# Patient Record
Sex: FEMALE | Race: WHITE | NOT HISPANIC OR LATINO | Employment: UNEMPLOYED | URBAN - METROPOLITAN AREA
[De-identification: names, ages, dates, MRNs, and addresses within clinical notes are randomized per-mention and may not be internally consistent; named-entity substitution may affect disease eponyms.]

---

## 2022-12-15 ENCOUNTER — OFFICE VISIT (OUTPATIENT)
Dept: MEDICAL GROUP | Facility: PHYSICIAN GROUP | Age: 62
End: 2022-12-15
Payer: COMMERCIAL

## 2022-12-15 VITALS
HEIGHT: 63 IN | WEIGHT: 137.8 LBS | DIASTOLIC BLOOD PRESSURE: 70 MMHG | SYSTOLIC BLOOD PRESSURE: 128 MMHG | TEMPERATURE: 98.7 F | HEART RATE: 86 BPM | OXYGEN SATURATION: 96 % | BODY MASS INDEX: 24.41 KG/M2

## 2022-12-15 DIAGNOSIS — Z12.11 COLON CANCER SCREENING: ICD-10-CM

## 2022-12-15 DIAGNOSIS — M25.562 CHRONIC PAIN OF LEFT KNEE: ICD-10-CM

## 2022-12-15 DIAGNOSIS — G89.29 CHRONIC PAIN OF LEFT KNEE: ICD-10-CM

## 2022-12-15 DIAGNOSIS — R03.0 ELEVATED BLOOD PRESSURE READING: ICD-10-CM

## 2022-12-15 DIAGNOSIS — R00.2 PALPITATIONS: ICD-10-CM

## 2022-12-15 DIAGNOSIS — E78.5 HYPERLIPIDEMIA, UNSPECIFIED HYPERLIPIDEMIA TYPE: ICD-10-CM

## 2022-12-15 PROBLEM — Z86.0100 PERSONAL HISTORY OF COLONIC POLYPS: Status: ACTIVE | Noted: 2021-02-04

## 2022-12-15 PROBLEM — M51.26 LUMBAR DISC HERNIATION: Status: ACTIVE | Noted: 2022-12-15

## 2022-12-15 PROBLEM — M25.569 KNEE PAIN: Status: ACTIVE | Noted: 2022-12-15

## 2022-12-15 PROBLEM — Z86.010 PERSONAL HISTORY OF COLONIC POLYPS: Status: ACTIVE | Noted: 2021-02-04

## 2022-12-15 PROCEDURE — 99204 OFFICE O/P NEW MOD 45 MIN: CPT | Performed by: FAMILY MEDICINE

## 2022-12-15 ASSESSMENT — ENCOUNTER SYMPTOMS
CHILLS: 0
DIZZINESS: 0
SHORTNESS OF BREATH: 0
NAUSEA: 0
FEVER: 0
SORE THROAT: 0
MYALGIAS: 0
BLURRED VISION: 0

## 2022-12-15 ASSESSMENT — PATIENT HEALTH QUESTIONNAIRE - PHQ9: CLINICAL INTERPRETATION OF PHQ2 SCORE: 0

## 2022-12-15 NOTE — LETTER
[facility]  Chhaya Chinchilla M.D.  [address]  [place] [address]  Fax: [phone]   Authorization for Release/Disclosure of   Protected Health Information   Name: ISADORA WILLIAMSON : 1960 SSN: [id number]   Address: [address] [place] [address]   [place] [address] Phone:    [phone] (home) [phone] (work)   I authorize the entity listed below to release/disclose the PHI below to:   [facility]/Chhaya Chinchilla M.D. and Chhaya Chinchilla M.D.   Provider or Entity Name:  VIRGIL Paulson   Address   City, State, [facility]   Phone:      Fax:     Reason for request: continuity of care   Information to be released:    [  ] LAST COLONOSCOPY,  including any PATH REPORT and follow-up  [  ] LAST FIT/COLOGUARD RESULT [  ] LAST DEXA  [  ] LAST MAMMOGRAM  [  ] LAST PAP  [  ] LAST LABS [  ] RETINA EXAM REPORT  [XX] IMMUNIZATION RECORDS  [XX] Release all info      [  ] Check here and initial the line next to each item to release ALL health information INCLUDING  _____ Care and treatment for drug and / or alcohol abuse  _____ HIV testing, infection status, or AIDS  _____ Genetic Testing    DATES OF SERVICE OR TIME PERIOD TO BE DISCLOSED: _____________  I understand and acknowledge that:  * This Authorization may be revoked at any time by you in writing, except if your health information has already been used or disclosed.  * Your health information that will be used or disclosed as a result of you signing this authorization could be re-disclosed by the recipient. If this occurs, your re-disclosed health information may no longer be protected by State or Federal laws.  * You may refuse to sign this Authorization. Your refusal will not affect your ability to obtain treatment.  * This Authorization becomes effective upon signing and will  on (date) __________.      If no date is indicated, this Authorization will  one (1) year from the signature date.    Name: Isadora Williamson    Signature:    Date:     12/15/2022       PLEASE FAX REQUESTED RECORDS BACK TO: [phone]

## 2022-12-15 NOTE — PROGRESS NOTES
Subjective:     CC:  Diagnoses of Palpitations, Elevated blood pressure reading, Hyperlipidemia, unspecified hyperlipidemia type, Chronic pain of left knee, and Colon cancer screening were pertinent to this visit.    HISTORY OF THE PRESENT ILLNESS: Patient is a 62 y.o. female. This pleasant patient is here today to establish care and discuss BP, palpitations. Her prior PCP was Dr. Gonzalez in [place], [place].    Problem   Palpitations    Chronic. Occasional.   Got a Holter monitor x2 weeks - normal. This was before 2020.  2 days ago BP went up to 160/100. Measured by manual cuff.      Elevated Blood Pressure Reading   Knee Pain    Left knee  Trauma - during kickboxing, 2 years ago  Had MRI in [place] - patellar fracture  Wondering if needs surgery  There will be pain after skiing.      Lumbar Disc Herniation    L5-L6  Has done PT which worked well in the past  Not doing the home PT exercises     Personal History of Colonic Polyps    Formatting of this note might be different from the original.  [id number] [facility] Non-Malignant Neoplastic Polyp Data  Region/  [1]: [place]/ Vaughn Whitfield MD  Date of Procedure [2]: 2/4/2021  Tubular adenoma(s) [3]: 6  Tubulovillous adenoma(s) [4]: 0  Villous adenoma(s) [5]: 0  Sessile serrated adenoma(s) [6]: 5   hyperplastic polyp(s) [7]: 1  Other (specify) [8]: N/A  Colorectal cancer found (Yes/No) [9]: No  END [id number]     Hyperlipidemia       Health Maintenance: Completed    ROS:   Review of Systems   Constitutional:  Negative for chills and fever.   HENT:  Negative for sore throat.    Eyes:  Negative for blurred vision.   Respiratory:  Negative for shortness of breath.    Cardiovascular:  Negative for chest pain.   Gastrointestinal:  Negative for nausea.   Genitourinary:  Negative for dysuria.   Musculoskeletal:  Negative for myalgias.   Skin:  Negative for rash.   Neurological:  Negative for dizziness.       Objective:     Exam: /70 (BP Location: Left arm,  "Patient Position: Sitting, BP Cuff Size: Small adult)   Pulse 86   Temp 37.1 °C (98.7 °F) (Temporal)   Ht 1.59 m (5' 2.6\")   Wt 62.5 kg (137 lb 12.8 oz)   SpO2 96%  Body mass index is 24.72 kg/m².    Physical Exam  Constitutional:       Appearance: Normal appearance.   HENT:      Head: Normocephalic and atraumatic.      Right Ear: Tympanic membrane and ear canal normal.      Left Ear: Tympanic membrane and ear canal normal.      Mouth/Throat:      Mouth: Mucous membranes are moist.      Pharynx: Oropharynx is clear.   Eyes:      Extraocular Movements: Extraocular movements intact.      Pupils: Pupils are equal, round, and reactive to light.   Neck:      Thyroid: No thyromegaly.   Cardiovascular:      Rate and Rhythm: Normal rate and regular rhythm.      Heart sounds: Normal heart sounds.   Pulmonary:      Effort: Pulmonary effort is normal.      Breath sounds: Normal breath sounds.   Abdominal:      General: Abdomen is flat. Bowel sounds are normal.      Palpations: Abdomen is soft. There is no mass.      Tenderness: There is no abdominal tenderness. There is no guarding.   Musculoskeletal:      Cervical back: Normal range of motion and neck supple.      Right lower leg: No edema.      Left lower leg: No edema.   Lymphadenopathy:      Cervical: No cervical adenopathy.   Skin:     General: Skin is warm and dry.   Neurological:      General: No focal deficit present.      Mental Status: She is alert.       Assessment & Plan:   62 y.o. female with the following -    1. Palpitations  Chronic, stable.  She reports that she is had intermittent episodes of palpitations.  She reports before 2020 she did have a 2-week heart monitor that was normal.  We will continue to monitor.    2. Elevated blood pressure reading  Chronic, stable.  She reports that she intermittently gets elevated blood pressure readings.  She checks her self with a manual cuff.  She states 2 days ago the blood pressure went up to 160/100 but her " blood pressure is fine in the office today.  We will continue to monitor.    - CBC WITHOUT DIFFERENTIAL; Future    3. Hyperlipidemia, unspecified hyperlipidemia type  Chronic, status unknown.  She states has not had any lab work for the last 2-3 years and we will go ahead and get basic screening labs.  - CBC WITHOUT DIFFERENTIAL; Future  - Comp Metabolic Panel; Future  - Lipid Profile; Future    4. Chronic pain of left knee  Chronic, uncontrolled.  She reports that she fractured her left patella during kickboxing 2 years ago.  This was diagnosed via MRI in [place].  She is wondering if it needs to be surgically treated as she is getting pain especially after skiing.  We will get an x-ray to evaluate the fracture and send her to orthopedics.  - DX-KNEE COMPLETE 4+ LEFT; Future  - Referral to Orthopedics    5. Colon cancer screening  She reports that she had a colonoscopy 2-3 years ago in [place].  She could not tell me when the next recommended colonoscopy was but then later in the visit she mentioned it was a poor bowel prep.  I am very confused about where she is in the colon screening process and timing and she would like to see an [facility] for colonoscopy so referral has been placed today.  - Referral to GI for Colonoscopy    Return in about 4 months (around 4/15/2023) for Review medical records.    Please note that this dictation was created using voice recognition software. I have made every reasonable attempt to correct obvious errors, but I expect that there are errors of grammar and possibly content that I did not discover before finalizing the note.

## 2022-12-22 ENCOUNTER — APPOINTMENT (OUTPATIENT)
Dept: RADIOLOGY | Facility: MEDICAL CENTER | Age: 62
End: 2022-12-22
Attending: FAMILY MEDICINE
Payer: COMMERCIAL

## 2022-12-22 DIAGNOSIS — G89.29 CHRONIC PAIN OF LEFT KNEE: ICD-10-CM

## 2022-12-22 DIAGNOSIS — M25.562 CHRONIC PAIN OF LEFT KNEE: ICD-10-CM

## 2022-12-22 PROCEDURE — 73564 X-RAY EXAM KNEE 4 OR MORE: CPT | Mod: LT

## 2022-12-23 ENCOUNTER — TELEPHONE (OUTPATIENT)
Dept: MEDICAL GROUP | Facility: PHYSICIAN GROUP | Age: 62
End: 2022-12-23
Payer: COMMERCIAL

## 2022-12-23 NOTE — TELEPHONE ENCOUNTER
----- Message from Chhaya Chinchilla M.D. sent at 12/22/2022  2:57 PM PST -----  Recent x-ray of the knee does show some arthritis in the lateral compartment.  No evidence of recurrent fracture and it is hard to visualize where the patellar fracture was previously.    If she still wants to see orthopedics then she is more than welcome to contact [facility].  That was where the referral was sent.

## 2022-12-23 NOTE — TELEPHONE ENCOUNTER
Phone Number Called: [phone]     Call outcome: Spoke to patient regarding message below.    Message: Spoke to patient and informed results. No further questions at this time.

## 2023-04-27 ENCOUNTER — OFFICE VISIT (OUTPATIENT)
Dept: MEDICAL GROUP | Facility: PHYSICIAN GROUP | Age: 63
End: 2023-04-27
Payer: COMMERCIAL

## 2023-04-27 VITALS
HEIGHT: 62 IN | BODY MASS INDEX: 24.66 KG/M2 | TEMPERATURE: 98.1 F | WEIGHT: 134 LBS | HEART RATE: 80 BPM | DIASTOLIC BLOOD PRESSURE: 62 MMHG | OXYGEN SATURATION: 98 % | SYSTOLIC BLOOD PRESSURE: 104 MMHG

## 2023-04-27 DIAGNOSIS — M25.562 CHRONIC PAIN OF LEFT KNEE: Primary | ICD-10-CM

## 2023-04-27 DIAGNOSIS — G89.29 CHRONIC PAIN OF LEFT KNEE: Primary | ICD-10-CM

## 2023-04-27 DIAGNOSIS — A04.8 H. PYLORI INFECTION: ICD-10-CM

## 2023-04-27 DIAGNOSIS — E78.00 PURE HYPERCHOLESTEROLEMIA: ICD-10-CM

## 2023-04-27 DIAGNOSIS — M51.26 LUMBAR DISC HERNIATION: ICD-10-CM

## 2023-04-27 DIAGNOSIS — Z12.31 ENCOUNTER FOR SCREENING MAMMOGRAM FOR MALIGNANT NEOPLASM OF BREAST: ICD-10-CM

## 2023-04-27 PROCEDURE — 99214 OFFICE O/P EST MOD 30 MIN: CPT | Performed by: FAMILY MEDICINE

## 2023-04-27 ASSESSMENT — ENCOUNTER SYMPTOMS
CHILLS: 0
SHORTNESS OF BREATH: 0
FEVER: 0

## 2023-04-27 ASSESSMENT — PATIENT HEALTH QUESTIONNAIRE - PHQ9: CLINICAL INTERPRETATION OF PHQ2 SCORE: 0

## 2023-04-27 NOTE — PROGRESS NOTES
"Subjective:     CC: review medical records, H pylori, back pain/sciatica    HPI:   Isadora presents today with    Problem   H. Pylori Infection    UGD in [place] in early 4/2023  +H pylori  Treated with bismuth quadruple - PPI, bismuth, metronidazole, tetracycline  Today is last day     Knee Pain    Left knee  Trauma - during kickboxing, 2 years ago  Had MRI in [place] - patellar fracture  Seen orthopedics  Repeat MRI showed no patellar fracture, but she has lateral meniscus tear with moderate lateral arthritis     Lumbar Disc Herniation    L5-L6  Has done PT which worked well in the past  Not doing the home PT exercises    Fell 3/16/2023  Bilateral buttock pain   Radiates posterior legs to heels  Tried massage, yoga - helping but not gone     Hyperlipidemia    Chronic.     3/2023  T chol 206  TG 67  HDL 86           Health Maintenance: Completed    ROS:  Review of Systems   Constitutional:  Negative for chills and fever.   Respiratory:  Negative for shortness of breath.    Cardiovascular:  Negative for chest pain.     Objective:     Exam:  /62 (BP Location: Right arm, Patient Position: Sitting, BP Cuff Size: Adult)   Pulse 80   Temp 36.7 °C (98.1 °F) (Temporal)   Ht 1.575 m (5' 2\")   Wt 60.8 kg (134 lb)   SpO2 98%   BMI 24.51 kg/m²  Body mass index is 24.51 kg/m².    Physical Exam  Constitutional:       Appearance: Normal appearance.   Cardiovascular:      Rate and Rhythm: Normal rate and regular rhythm.      Heart sounds: Normal heart sounds.   Pulmonary:      Effort: Pulmonary effort is normal.      Breath sounds: Normal breath sounds.   Musculoskeletal:      Cervical back: Normal range of motion and neck supple.      Comments: Back: Full ROM, 5/5 LE strength, sensation intact bilaterally in LE, no TTP over spinous processes, paraspinals non-tender, SI joint tender bilaterally, straight leg raise negative, Telly test positive bilaterally   Neurological:      Mental Status: She is alert. "           Assessment & Plan:     63 y.o. female with the following -     1. Chronic pain of left knee  Chronic, stable.  She is established with orthopedics and MRI showed that the patella fracture has healed but she did develop a lateral meniscus tear.  Currently is not causing her a lot of pains at this time they are not treating.  She will follow-up with orthopedics as directed.    2. Pure hypercholesterolemia  Chronic, stable.  Recent cholesterol panel did show mildly elevated total cholesterol and LDL.  ASCVD risk is under 5% so we do not need start medication at this time.  We will monitor regularly.    3. H. pylori infection  Acute.  She reports that she recently had an upper endoscopy in [place] earlier this month.  It did show H. pylori and today is the last day of a bismuth quadruple therapy.  We will recheck for the H. pylori stool antigen in at least 4 weeks to confirm eradication of this infection.  - H.PYLORI STOOL ANTIGEN; Future    4. Lumbar disc herniation  Chronic, acutely exacerbated.  She does have a history of lumbar disc herniation has had back pain in the past.  Recently she has been noticing symptoms of sciatica bilaterally.  Her exam does indicate likely SI joint dysfunction with piriformis syndrome bilaterally.  I have provided a prescription with home exercises.  If this does not help and we can get her back to physical therapy.  She can use over-the-counter pain medication as needed.    5. Encounter for screening mammogram for malignant neoplasm of breast  - MA-SCREENING MAMMO BILAT W/TOMOSYNTHESIS W/CAD; Future    I spent a total of 35 minutes with record review, outside record review for health maintenance and vaccinations, reviewed labs, exam, communication with the patient, and documentation of this encounter.      Return in about 6 months (around 10/27/2023) for Annual/wellness visit.    Please note that this dictation was created using voice recognition software. I have made every  reasonable attempt to correct obvious errors, but I expect that there are errors of grammar and possibly content that I did not discover before finalizing the note.

## 2023-04-27 NOTE — LETTER
[facility]  Chhaya Chinchilla M.D.  [address]  [place] [address]  Fax: [phone]   Authorization for Release/Disclosure of   Protected Health Information   Name: ISADORA WILLIAMSON : 1960 SSN: [id number]   Address: [address] [place] [address]   [place] [address] Phone:    [phone] (home) [phone] (work)   I authorize the entity listed below to release/disclose the PHI below to:   [facility]/Chhaya Chinchilla M.D. and Chhaya Chinchilla M.D.   Provider or Entity Name:     Address   City, State, Zip   Phone:      Fax:     Reason for request: continuity of care   Information to be released:    [  ] LAST COLONOSCOPY,  including any PATH REPORT and follow-up  [  ] LAST FIT/COLOGUARD RESULT [  ] LAST DEXA  [  ] LAST MAMMOGRAM  [  ] LAST PAP  [  ] LAST LABS [  ] RETINA EXAM REPORT  [  ] IMMUNIZATION RECORDS  [  ] Release all info      [  ] Check here and initial the line next to each item to release ALL health information INCLUDING  _____ Care and treatment for drug and / or alcohol abuse  _____ HIV testing, infection status, or AIDS  _____ Genetic Testing    DATES OF SERVICE OR TIME PERIOD TO BE DISCLOSED: _____________  I understand and acknowledge that:  * This Authorization may be revoked at any time by you in writing, except if your health information has already been used or disclosed.  * Your health information that will be used or disclosed as a result of you signing this authorization could be re-disclosed by the recipient. If this occurs, your re-disclosed health information may no longer be protected by State or Federal laws.  * You may refuse to sign this Authorization. Your refusal will not affect your ability to obtain treatment.  * This Authorization becomes effective upon signing and will  on (date) __________.      If no date is indicated, this Authorization will  one (1) year from the signature date.    Name: Isadora Williamson  Signature: Date:   2023     PLEASE FAX  REQUESTED RECORDS BACK TO: [phone]

## 2023-05-01 ENCOUNTER — HOSPITAL ENCOUNTER (OUTPATIENT)
Dept: RADIOLOGY | Facility: MEDICAL CENTER | Age: 63
End: 2023-05-01
Attending: FAMILY MEDICINE
Payer: COMMERCIAL

## 2023-05-01 DIAGNOSIS — Z12.31 ENCOUNTER FOR SCREENING MAMMOGRAM FOR MALIGNANT NEOPLASM OF BREAST: ICD-10-CM

## 2023-05-01 PROCEDURE — 77063 BREAST TOMOSYNTHESIS BI: CPT

## 2023-05-03 ENCOUNTER — NON-PROVIDER VISIT (OUTPATIENT)
Dept: MEDICAL GROUP | Facility: PHYSICIAN GROUP | Age: 63
End: 2023-05-03
Payer: COMMERCIAL

## 2023-05-03 ENCOUNTER — TELEPHONE (OUTPATIENT)
Dept: MEDICAL GROUP | Facility: PHYSICIAN GROUP | Age: 63
End: 2023-05-03

## 2023-05-03 DIAGNOSIS — Z23 NEED FOR VACCINATION: ICD-10-CM

## 2023-05-03 PROCEDURE — 90715 TDAP VACCINE 7 YRS/> IM: CPT | Performed by: FAMILY MEDICINE

## 2023-05-03 PROCEDURE — 90471 IMMUNIZATION ADMIN: CPT | Performed by: FAMILY MEDICINE

## 2023-05-03 NOTE — PROGRESS NOTES
"Isadora Williamson is a 63 y.o. female here for a non-provider visit for:   TDAP    Reason for immunization: continue or complete series started at the office  Immunization records indicate need for vaccine: Yes, confirmed with Epic  Minimum interval has been met for this vaccine: Yes  ABN completed: Not Indicated    VIS Dated  8/6/21 was given to patient: Yes  All IAC Questionnaire questions were answered \"No.\"    Patient tolerated injection and no adverse effects were observed or reported: Yes    Pt scheduled for next dose in series: No  "

## 2023-05-03 NOTE — TELEPHONE ENCOUNTER
Patient is on the MA Schedule today for TDAP vaccine/injection.    SPECIFIC Action To Be Taken: Orders pending, please sign.

## 2023-05-23 ENCOUNTER — HOSPITAL ENCOUNTER (OUTPATIENT)
Dept: RADIOLOGY | Facility: MEDICAL CENTER | Age: 63
End: 2023-05-23
Payer: COMMERCIAL

## 2023-07-11 ENCOUNTER — HOSPITAL ENCOUNTER (OUTPATIENT)
Dept: RADIOLOGY | Facility: MEDICAL CENTER | Age: 63
End: 2023-07-11
Attending: PHYSICAL MEDICINE & REHABILITATION
Payer: COMMERCIAL

## 2023-07-11 DIAGNOSIS — M54.2 CERVICALGIA: ICD-10-CM

## 2023-07-11 DIAGNOSIS — R10.2 ADNEXAL TENDERNESS, RIGHT: ICD-10-CM

## 2023-07-11 DIAGNOSIS — M54.50 LOW BACK PAIN, UNSPECIFIED BACK PAIN LATERALITY, UNSPECIFIED CHRONICITY, UNSPECIFIED WHETHER SCIATICA PRESENT: ICD-10-CM

## 2023-07-11 PROCEDURE — 72040 X-RAY EXAM NECK SPINE 2-3 VW: CPT

## 2023-07-11 PROCEDURE — 72100 X-RAY EXAM L-S SPINE 2/3 VWS: CPT

## 2023-07-11 PROCEDURE — 72170 X-RAY EXAM OF PELVIS: CPT

## 2023-07-12 NOTE — OP THERAPY EVALUATION
Outpatient Physical Therapy  INITIAL EVALUATION    [facility] [place]  [address], [address]  [place] [address]  Phone:  [phone]    Date of Evaluation: 07/13/2023    Patient: Isadora Williamson  YOB: 1960  MRN: [id number]     Referring Provider: Chhaya Chinchilla M.D.  [address]  [place] [address]   Referring Diagnosis No admission diagnoses are documented for this encounter.     Time Calculation  Start time: 0130  Stop time: 0215 Time Calculation (min): 45 minutes         Chief Complaint: No chief complaint on file.    Visit Diagnoses     ICD-10-CM   1. Lumbar disc herniation  M51.26       Date of onset of impairment: 2/13/2023    Subjective:   History of Present Illness:     Mechanism of injury:  CMHx: Pt indicates that she had hx of LBP with hernia around L3/4 she thinks she had that. She notes it got better with physiotherapy. She normally is really active but after this fall she has had trouble with pains in her buttock region and down the left leg. She notes that relates a lot of this pain to this fall. She notes that she is really stiff. She notes she had Physio in [place] in March for about two weeks  with cold and hot packs, estim/laser and massage; felt worse the day after so she stopped. She notes she will be leaving back to [place] for at least one month to work in September.     Pain Behavior  Sxs: R sided LBP and B buttock pain; and tingling in the lateral legs down to the shins and to the lateral or anterior feet     Aggs: hiking or walking notes she can't walk fast mostly on the R, couldn't sleep on the R (better now), couldn't lie on her stomach or still struggles in prone press up,     Eas: piriformis stretching,     24 hour: AM worse and stiff in buttock and lower back; does get better within 1 hr. Tries to stretch    Sleep: does okay but struggles with the R side upper quarter     Irritability: mod    Yellow flags: scared to have  "bulging disc or issues    Imaging: radiographs of L/S so far    PMHx: hx of LBP and bulging disc and had some strength exercises which was helpful. Original bulging disc in early 2000s but recovered and was doing well after      Profession/Recreation: normally goes to gym almost every day; since moved to [place] approx 1 year ago she has stopped exercising as much. She has mostly just skied now and occ fitness classes, She bikes now and notes no issues here. Walking/hiking. Kayaking more due to R upper quarter but LBP >1 hr    Goals: dec pain                            No past medical history on file.  Past Surgical History:   Procedure Laterality Date    REPAIR, KNEE, ACL Right 2004    PRIMARY C SECTION  1996    PRIMARY C SECTION  1993     Social History     Tobacco Use    Smoking status: Never    Smokeless tobacco: Never   Substance Use Topics    Alcohol use: Yes     Alcohol/week: 1.8 oz     Types: 3 Glasses of wine per week     Family and Occupational History     Socioeconomic History    Marital status:      Spouse name: Not on file    Number of children: Not on file    Years of education: Not on file    Highest education level: Not on file   Occupational History    Not on file       Objective     General Comments     Spine Comments   Standing:  Hip PROm Wfl ea side no issues; L slightly tighter per pt with ER stretch    AROM  Flexion tighter L leg WFL  Extension inc R sided LBP WFL  RSB WFL  LSB WFL     + piriformis R TTP    (-) SLR with DF/Ev of foot B    (-) DTR, dermatomes, myotomes         Therapeutic Exercises (CPT 57113):     1. UPOC 9/13/23    2. Modified piriformis stretch x45\"ea    3. Repeated flexion in standing 2x10    5. REIL (gentle to start on elbows)      Therapeutic Exercise Summary: Home Exercise Program Created and Reviewed with patient    Modified piriformis stretch x45\"ea  Repeated flexion in standing 2x10          Time-based treatments/modalities:    Physical Therapy Timed Treatment " Charges  Therapeutic exercise minutes (CPT 32122): 15 minutes      Assessment, Response and Plan:   Impairments: impaired functional mobility, lacks appropriate home exercise program and pain with function    Assessment details:  PT finds s/sx consistent with LBP with radiating quality/radicular nature. This is evident with peripherilization of sxs subjectively and with AROM,  and location of sxs. Pt is negative for screens of radiculopathy/hard neurological findings with dermatomes, myotomes, and DTRs.  Barriers to therapy:  Comorbidities  Prognosis: fair    Goals:   Short Term Goals:   -pt meets MCID for RMQ  -pt with ability to perform AROM without pain in L/S   -pt with neg SLR and piriformis palpation to improve n related pain  -pt with pain in AM <30 min now  Short term goal time span:  2-4 weeks      Long Term Goals:    -pt meets MCID for RMQ  -pt with ability to perform AROM without pain in L/S and performs IADLs without pain  -pt walks >30 min without pain or issues  -pt with pain in AM <20 min now  -pt lifts >25% BW without pain or issues  Long term goal time span:  6-8 weeks    Plan:   Therapy options:  Physical therapy treatment to continue  Planned therapy interventions:  E Stim Attended (CPT 42899), E Stim Unattended (CPT 06412), Hot or Cold Pack Therapy (CPT 43271), Manual Therapy (CPT 87012), Mechanical Traction (CPT 63961), Neuromuscular Re-education (CPT 48858) and Therapeutic Activities (CPT 20511)  Frequency:  2x week  Duration in weeks:  8  Duration in visits:  16  Discussed with:  Patient      Functional Assessment Used  Abbe Manny Low Back Pain and Disability Score: 20.83     Referring provider co-signature:  I have reviewed this plan of care and my co-signature certifies the need for services.    Certification Period: 07/13/2023 to  09/14/23    Physician Signature: ________________________________ Date: ______________

## 2023-07-13 ENCOUNTER — PHYSICAL THERAPY (OUTPATIENT)
Dept: PHYSICAL THERAPY | Facility: REHABILITATION | Age: 63
End: 2023-07-13
Attending: FAMILY MEDICINE
Payer: COMMERCIAL

## 2023-07-13 DIAGNOSIS — M51.26 LUMBAR DISC HERNIATION: ICD-10-CM

## 2023-07-13 PROCEDURE — 97162 PT EVAL MOD COMPLEX 30 MIN: CPT

## 2023-07-13 PROCEDURE — 97110 THERAPEUTIC EXERCISES: CPT

## 2023-07-17 ENCOUNTER — PHYSICAL THERAPY (OUTPATIENT)
Dept: PHYSICAL THERAPY | Facility: REHABILITATION | Age: 63
End: 2023-07-17
Attending: FAMILY MEDICINE
Payer: COMMERCIAL

## 2023-07-17 DIAGNOSIS — M51.26 LUMBAR DISC HERNIATION: ICD-10-CM

## 2023-07-17 PROCEDURE — 97110 THERAPEUTIC EXERCISES: CPT

## 2023-07-17 NOTE — OP THERAPY DAILY TREATMENT
"  Outpatient Physical Therapy  DAILY TREATMENT     [facility] Outpatient Physical Therapy [place] [address], [address]  [place] [address]  Phone:  [phone]    Date: 07/17/2023  Patient: Isadora Williamson  YOB: 1960  MRN: [id number]   Time Calculation  Start time: 0415  Stop time: 0455 Time Calculation (min): 40 minutes   Chief Complaint: No chief complaint on file.  Visit #: 2    SUBJECTIVE:  Pt indicates she was swimming and noted that her hip and back hurt on the R side. She   Sxs: R sided LBP and B buttock pain; and tingling in the lateral legs down to the shins and to the lateral or anterior feet      Aggs: hiking or walking notes she can't walk fast mostly on the R, couldn't sleep on the R (better now), couldn't lie on her stomach or still struggles in prone press up,    OBJECTIVE:  Current objective measures:    check hip flex stretch; strength measures  Prone hip ext hurts, L/S ext hurts    None below:  Hip PROm Wfl ea side no issues; L slightly tighter per pt with ER stretch     AROM  Flexion tighter L leg WFL  Extension inc R sided LBP WFL  RSB WFL  LSB WFL      + piriformis R TTP  Prone press up or prone leg lift brings on LBP          Therapeutic Exercises (CPT 46654):     1. UPOC 9/13/23    2. modified piriformis str, x1'    3. linda str, SKTC with leg no pain; with leg extended LBP, NC to painful obj measures after    4. nerve tensioner, R side x3' on/off, dec sxs standing and with prone leg raise    7. H/L clams, x2' on/off    8. clams, PTB xFATIGUE    9. bridges, PTB xfatigue      Therapeutic Exercise Summary:  2. Modified piriformis stretch x45\"ea    3. Repeated flexion in standing 2x10    5. Nerve tensioner x3'  -glute bridge with PTB x30  -clams xfatigue ea PTB      Time-based treatments/modalities:  Physical Therapy Timed Treatment Charges  Therapeutic exercise minutes (CPT 39223): 40 minutes  ASSESSMENT:   Response to treatment: Pt with slight improvement today with n " atilio; added to HEP. Still very sensitive to compression based postures but did well with gluteal strength without pain today.     PLAN/RECOMMENDATIONS:   Plan for treatment: therapy treatment to continue next visit.  Planned interventions for next visit: continue with current treatment.

## 2023-07-19 ENCOUNTER — OFFICE VISIT (OUTPATIENT)
Dept: MEDICAL GROUP | Facility: PHYSICIAN GROUP | Age: 63
End: 2023-07-19
Payer: COMMERCIAL

## 2023-07-19 VITALS
OXYGEN SATURATION: 98 % | SYSTOLIC BLOOD PRESSURE: 124 MMHG | TEMPERATURE: 97.6 F | HEART RATE: 77 BPM | WEIGHT: 136.6 LBS | BODY MASS INDEX: 25.14 KG/M2 | DIASTOLIC BLOOD PRESSURE: 62 MMHG | HEIGHT: 62 IN

## 2023-07-19 DIAGNOSIS — A04.8 H. PYLORI INFECTION: ICD-10-CM

## 2023-07-19 PROCEDURE — 3074F SYST BP LT 130 MM HG: CPT | Performed by: FAMILY MEDICINE

## 2023-07-19 PROCEDURE — 99214 OFFICE O/P EST MOD 30 MIN: CPT | Performed by: FAMILY MEDICINE

## 2023-07-19 PROCEDURE — 3078F DIAST BP <80 MM HG: CPT | Performed by: FAMILY MEDICINE

## 2023-07-19 RX ORDER — AMOXICILLIN 500 MG/1
1000 CAPSULE ORAL 2 TIMES DAILY
Qty: 56 CAPSULE | Refills: 0 | Status: SHIPPED | OUTPATIENT
Start: 2023-07-19 | End: 2023-08-02

## 2023-07-19 RX ORDER — METRONIDAZOLE 500 MG/1
500 TABLET ORAL 3 TIMES DAILY
Qty: 42 TABLET | Refills: 0 | Status: SHIPPED | OUTPATIENT
Start: 2023-07-19 | End: 2023-07-19

## 2023-07-19 RX ORDER — CLARITHROMYCIN 500 MG/1
500 TABLET, COATED ORAL 2 TIMES DAILY
Qty: 28 TABLET | Refills: 0 | Status: SHIPPED | OUTPATIENT
Start: 2023-07-19 | End: 2023-08-02

## 2023-07-19 RX ORDER — OMEPRAZOLE 20 MG/1
20 CAPSULE, DELAYED RELEASE ORAL 2 TIMES DAILY
Qty: 28 CAPSULE | Refills: 0 | Status: SHIPPED | OUTPATIENT
Start: 2023-07-19 | End: 2023-08-02

## 2023-07-19 ASSESSMENT — ENCOUNTER SYMPTOMS
FEVER: 0
CHILLS: 0
SHORTNESS OF BREATH: 0

## 2023-07-19 NOTE — PROGRESS NOTES
"Subjective:     CC: H pylori    HPI:   Isadora presents today with    Problem   H. Pylori Infection    EGD in [place] in early 4/2023 - +H pylori  Treated with bismuth quadruple - PPI, bismuth, metronidazole, tetracycline x10 days    Repeat H pylori is still positive.         Health Maintenance: Completed    ROS:  Review of Systems   Constitutional:  Negative for chills and fever.   Respiratory:  Negative for shortness of breath.    Cardiovascular:  Negative for chest pain.       Objective:     Exam:  /62 (BP Location: Right arm, Patient Position: Sitting, BP Cuff Size: Adult)   Pulse 77   Temp 36.4 °C (97.6 °F) (Temporal)   Ht 1.575 m (5' 2\")   Wt 62 kg (136 lb 9.6 oz)   SpO2 98%   BMI 24.98 kg/m²  Body mass index is 24.98 kg/m².    Physical Exam  Constitutional:       Appearance: Normal appearance.   Cardiovascular:      Rate and Rhythm: Normal rate and regular rhythm.      Heart sounds: Normal heart sounds.   Pulmonary:      Effort: Pulmonary effort is normal.      Breath sounds: Normal breath sounds.   Musculoskeletal:      Cervical back: Normal range of motion and neck supple.   Neurological:      Mental Status: She is alert.               Assessment & Plan:     63 y.o. female with the following -     1. H. pylori infection  Chronic, unstable. She has completed the bismuth quadruple therapy, but unfortunately this did not eradicate the H pylori. Therefore, we will try the clarithromycin triple next and repeat testing 4 weeks after the course is completed.  - clarithromycin (BIAXIN) 500 MG Tab; Take 1 Tablet by mouth 2 times a day for 14 days.  Dispense: 28 Tablet; Refill: 0  - omeprazole (PRILOSEC) 20 MG delayed-release capsule; Take 1 Capsule by mouth 2 times a day for 14 days.  Dispense: 28 Capsule; Refill: 0  - amoxicillin (AMOXIL) 500 MG Cap; Take 2 Capsules by mouth 2 times a day for 14 days.  Dispense: 56 Capsule; Refill: 0    Return if symptoms worsen or fail to improve.    Please note that " this dictation was created using voice recognition software. I have made every reasonable attempt to correct obvious errors, but I expect that there are errors of grammar and possibly content that I did not discover before finalizing the note.

## 2023-07-26 ENCOUNTER — PHYSICAL THERAPY (OUTPATIENT)
Dept: PHYSICAL THERAPY | Facility: REHABILITATION | Age: 63
End: 2023-07-26
Attending: FAMILY MEDICINE
Payer: COMMERCIAL

## 2023-07-26 DIAGNOSIS — M51.26 LUMBAR DISC HERNIATION: ICD-10-CM

## 2023-07-26 PROCEDURE — 97140 MANUAL THERAPY 1/> REGIONS: CPT

## 2023-07-26 PROCEDURE — 97110 THERAPEUTIC EXERCISES: CPT

## 2023-07-26 NOTE — OP THERAPY DAILY TREATMENT
"  Outpatient Physical Therapy  DAILY TREATMENT     [facility] Outpatient Physical Therapy [place] [address] [address] [facility], [address]  [place] [address]  Phone:  [phone]    Date: 07/26/2023  Patient: Isadora Williamson  YOB: 1960  MRN: [id number]   Time Calculation  Start time: 0115  Stop time: 0155 Time Calculation (min): 40 minutes   Chief Complaint: No chief complaint on file.  Visit #: 3    SUBJECTIVE: pt notes the stretching does help a little; feels less worse than when she started PT.     Sxs: R sided LBP and B buttock pain; and tingling in the lateral legs down to the shins and to the lateral or anterior feet      Aggs: -hiking or walking notes she can't walk fast mostly on the R,   -couldn't sleep on the R (better now),   -couldn't lie on her stomach or still struggles in prone press up,    OBJECTIVE:   Current objective measures:    check hip flex stretch; strength measures  Prone hip ext hurts, L/S ext hurts    None below:  Hip PROm Wfl ea side no issues; L slightly tighter per pt with ER stretch     AROM  Flexion tighter L leg WFL  Extension inc R sided LBP WFL  RSB WFL  LSB WFL      + piriformis R TTP  Prone press up or prone leg lift brings on LBP          Therapeutic Exercises (CPT 84734):     1. UPOC 9/13/23    2. modified piriformis str, x1'    3. linda str, SKTC with leg no pain; with leg extended LBP, NC to painful obj measures after    4. nerve tensioner, R side x3' on/off, dec sxs standing and with prone leg raise    7. H/L clams, x2' on/off    8. clams, PTB xFATIGUE    9. bridges, PTB ABD 2x1'    10. bird dog LE only, 2x10      Therapeutic Exercise Summary:  2. Modified piriformis stretch x45\"ea    3. Repeated flexion in standing 2x10    5. Nerve tensioner x3'  -glute bridge with PTB x30  -clams xfatigue ea PTB  -bird dog LE only  -ball squats or wall squats light to no weight    Therapeutic Treatments and Modalities:     1. Manual Therapy (CPT 19948), long axis traction " GIII    Time-based treatments/modalities:  Physical Therapy Timed Treatment Charges  Manual therapy minutes (CPT 73816): 8 minutes  Therapeutic exercise minutes (CPT 20953): 32 minutes  ASSESSMENT:   Response to treatment: Pt with cont pain in extended based postures/through her RLE. Does loosen some with stretching as well as with PT traction in long axis. Cont to progress as able.     PLAN/RECOMMENDATIONS:   Plan for treatment: therapy treatment to continue next visit.  Planned interventions for next visit: continue with current treatment.

## 2023-07-27 NOTE — OP THERAPY DAILY TREATMENT
"  Outpatient Physical Therapy  DAILY TREATMENT     [facility] Outpatient Physical Therapy [place] [id number] [facility], [address]  [place] [address]  Phone:  [phone]    Date: 07/31/2023  Patient: Isadora Williamson  YOB: 1960  MRN: [id number]   Time Calculation  Start time: 0115  Stop time: 0200 Time Calculation (min): 45 minutes   Chief Complaint: No chief complaint on file.  Visit #: 4    SUBJECTIVE: notes pain is better than 1 mo ago. Still sxs AM. The L side is much better overall. Still getting tingling in the leg. Has MRI for Wednesday scheduled this week.     Sxs: R sided LBP and B buttock pain; and tingling in the lateral legs down to the shins and to the lateral or anterior feet      Aggs: -hiking or walking notes she can't walk fast mostly on the R,   -couldn't sleep on the R (better now),   -couldn't lie on her stomach or still struggles in prone press up,    OBJECTIVE:    Current objective measures:   Prone hip ext hurts, L/S ext hurts    None below:  Hip PROm Wfl ea side no issues; L slightly tighter per pt with ER stretch     AROM  Flexion tighter L leg WFL  Extension inc R sided LBP WFL  RSB WFL  LSB WFL      + piriformis R TTP  Prone press up or prone leg lift brings on LBP          Therapeutic Exercises (CPT 50760):     1. UPOC 9/13/23    2. modified piriformis str, x1'    3. linda str, SKTC with leg no pain; with leg extended LBP, NC to painful obj measures after    4. nerve tensioner, R side x3' on/off, dec sxs standing and with prone leg raise    8. clams, PTB xFATIGUE    9. bridges, PTB ABD 2x1'    10. bird dog LE only, 3x30\"    11. wall squats/hack squat, 3x15    12. split squat, 3x10      Therapeutic Exercise Summary:  2. Modified piriformis stretch x45\"ea    3. Repeated flexion in standing 2x10    5. Nerve tensioner x3'  -glute bridge with PTB x30  -clams xfatigue ea PTB  -bird dog LE only  -ball squats or wall squats light to no weight    Therapeutic Treatments and " Modalities:     1. Manual Therapy (CPT 46896), long axis traction GIII, shown self traction with pull up assist band    Time-based treatments/modalities:  Physical Therapy Timed Treatment Charges  Manual therapy minutes (CPT 63464): 8 minutes  Therapeutic exercise minutes (CPT 60213): 36 minutes  ASSESSMENT: pt with cont sensitivity and pain with lumbar or hip extension force; does improve with repetition and exercise today. Tolerated squatting progressions without sig issues. Will cont to recommend skilled PT care in order to address this. Responded not as well on self traction today as compared to last visit.     PLAN/RECOMMENDATIONS:   Plan for treatment: therapy treatment to continue next visit.  Planned interventions for next visit: continue with current treatment.

## 2023-07-31 ENCOUNTER — PHYSICAL THERAPY (OUTPATIENT)
Dept: PHYSICAL THERAPY | Facility: REHABILITATION | Age: 63
End: 2023-07-31
Attending: FAMILY MEDICINE
Payer: COMMERCIAL

## 2023-07-31 DIAGNOSIS — M51.26 LUMBAR DISC HERNIATION: ICD-10-CM

## 2023-07-31 PROCEDURE — 97140 MANUAL THERAPY 1/> REGIONS: CPT

## 2023-07-31 PROCEDURE — 97110 THERAPEUTIC EXERCISES: CPT

## 2023-08-02 NOTE — OP THERAPY DAILY TREATMENT
"  Outpatient Physical Therapy  DAILY TREATMENT     [facility] Outpatient Physical Therapy [place] [address] [address] [facility], [address]  [place] [address]  Phone:  [phone]    Date: 08/03/2023  Patient: Isadora Williamson  YOB: 1960  MRN: [id number]   Time Calculation  Start time: 0945  Stop time: 1030 Time Calculation (min): 45 minutes   Chief Complaint: No chief complaint on file.  Visit #: 5    SUBJECTIVE: pt notes similar pains; AM pains are still there. Still getting sxs down the RLE.      Sxs: R sided LBP and B buttock pain; and tingling in the lateral legs down to the shins and to the lateral or anterior feet      Aggs: -hiking or walking notes she can't walk fast mostly on the R,   -couldn't sleep on the R (better now),   -couldn't lie on her stomach or still struggles in prone press up,    OBJECTIVE:   Current objective measures:   Prone hip ext hurts, L/S ext hurts    None below:  Hip PROm Wfl ea side no issues; L slightly tighter per pt with ER stretch     AROM  Flexion tighter L leg WFL  Extension inc R sided LBP WFL  RSB WFL  LSB WFL      + piriformis R TTP  Prone press up or prone leg lift brings on LBP        Therapeutic Exercises (CPT 69576):     1. UPOC 9/13/23    2. modified piriformis str, x1'    3. linda str, SKTC with leg no pain; with leg extended LBP, NC to painful obj measures after    4. nerve tensioner, R side x3' on/off, dec sxs standing and with prone leg raise    8. clams, PTB xFATIGUE    9. bridges, PTB ABD 2x1'    10. bird dog LE only, 3x30\"    11. wall squats/hack squat, 3x15    12. split squat, 3x10    13. Shuttle, 5B SL 5B 2xfatigue ea      Therapeutic Exercise Summary:  2. Modified piriformis stretch x45\"ea    3. Repeated flexion in standing 2x10    5. Nerve tensioner x3'  -glute bridge with PTB x30  -clams xfatigue ea PTB  -bird dog LE only  -ball squats or wall squats light to no weight    Therapeutic Treatments and Modalities:     1. Manual Therapy (CPT 16675), long " axis traction GIII, shown self traction with pull up assist band    Time-based treatments/modalities:  Physical Therapy Timed Treatment Charges  Therapeutic exercise minutes (CPT 84895): 40 minutes  ASSESSMENT: pt with similar objective measures and pain with extension postures or LE extension of the R side. Cont graded strengthening for this with skilled PT care; she does well with squatting without pain. Will consider hip dominant strategies next time.     PLAN/RECOMMENDATIONS:   Plan for treatment: therapy treatment to continue next visit.  Planned interventions for next visit: continue with current treatment.

## 2023-08-03 ENCOUNTER — PHYSICAL THERAPY (OUTPATIENT)
Dept: PHYSICAL THERAPY | Facility: REHABILITATION | Age: 63
End: 2023-08-03
Attending: FAMILY MEDICINE
Payer: COMMERCIAL

## 2023-08-03 DIAGNOSIS — M51.26 LUMBAR DISC HERNIATION: ICD-10-CM

## 2023-08-03 PROCEDURE — 97110 THERAPEUTIC EXERCISES: CPT

## 2023-08-04 NOTE — OP THERAPY DAILY TREATMENT
"  Outpatient Physical Therapy  DAILY TREATMENT     [facility] Outpatient Physical Therapy [place] [address] [facility], [address]  [place] [address]  Phone:  [phone]    Date: 08/07/2023  Patient: Isadora Williamson  YOB: 1960  MRN: [id number]   Time Calculation  Start time: 0115  Stop time: 0205 Time Calculation (min): 50 minutes   Chief Complaint: No chief complaint on file.  Visit #: 6    SUBJECTIVE: Pt notes that she is still getting pain in AM; she still notes pain can switch sides.     Sxs: R sided LBP and B buttock pain; and tingling in the lateral legs down to the shins and to the lateral or anterior feet      Aggs: -hiking or walking notes she can't walk fast mostly on the R,   -couldn't sleep on the R (better now),   -couldn't lie on her stomach or still struggles in prone press up,    OBJECTIVE:   Current objective measures:   Prone hip ext hurts, L/S ext hurts     AROM  Flexion tighter L leg WFL  Extension inc L tingling in calf a little  RSB WFL  LSB WFL     None below:  Hip PROm Wfl ea side no issues; L slightly tighter per pt with ER stretch  + piriformis R TTP  Prone press up or prone leg lift brings on LBP        Therapeutic Exercises (CPT 60628):     1. UPOC 9/13/23    2. modified piriformis str, x1'    3. linda str, SKTC with leg no pain; with leg extended LBP, NC to painful obj measures after    4. nerve tensioner, R/L: side x2' on/off, dec sxs standing and with prone leg raise    8. clams, PTB xFATIGUE    9. bridges, PTB RSB x20    10. bird dog LE only, 3x30\", not today    11. wall squats/hack squat, 3x15; 15#    12. split squat, 3x10    13. Shuttle, 5B SL 3x15 ea      Therapeutic Exercise Summary:  2. Modified piriformis stretch x45\"ea    3. Repeated flexion in standing 2x10    5. Nerve tensioner x3'  -glute bridge with PTB x30  -clams xfatigue ea PTB  -bird dog LE only  -ball squats or wall squats light to no weight    Therapeutic Treatments and Modalities:     1. Manual Therapy " (CPT 52308), long axis traction GIII, not today    2. E Stim Unattended (CPT 49113), x15' heat and IFC; bell in place for safety if too hot    Time-based treatments/modalities:  Physical Therapy Timed Treatment Charges  Therapeutic exercise minutes (CPT 03820): 40 minutes  ASSESSMENT: Pt still with AM sxs and functional reports with pain still. She is improving in her strength and will need graded activity strengthening.     PLAN/RECOMMENDATIONS:   Plan for treatment: therapy treatment to continue next visit.  Planned interventions for next visit: continue with current treatment.

## 2023-08-07 ENCOUNTER — PHYSICAL THERAPY (OUTPATIENT)
Dept: PHYSICAL THERAPY | Facility: REHABILITATION | Age: 63
End: 2023-08-07
Attending: FAMILY MEDICINE
Payer: COMMERCIAL

## 2023-08-07 DIAGNOSIS — M51.26 LUMBAR DISC HERNIATION: ICD-10-CM

## 2023-08-07 PROCEDURE — 97110 THERAPEUTIC EXERCISES: CPT

## 2023-08-07 PROCEDURE — 97014 ELECTRIC STIMULATION THERAPY: CPT

## 2023-08-08 NOTE — OP THERAPY DAILY TREATMENT
"  Outpatient Physical Therapy  DAILY TREATMENT     [facility] Outpatient Physical Therapy [place] [address] [facility], [address]  [place] [address]  Phone:  [phone]    Date: 08/09/2023  Patient: Isadora Williamson  YOB: 1960  MRN: [id number]   Time Calculation  Start time: 0115  Stop time: 0210 Time Calculation (min): 55 minutes   Chief Complaint: No chief complaint on file.  Visit #: 7    SUBJECTIVE: pt indicates that she cont to get AM sxs and when lying still hurts still on the R side. She got an MRI today.     Sxs: R sided LBP and B buttock pain; and tingling in the lateral legs down to the shins and to the lateral or anterior feet      Aggs:   -hiking or walking notes she can't walk fast mostly on the R,   -couldn't sleep on the R (better now),   -couldn't lie on her stomach or still struggles in prone press up,    OBJECTIVE:   Current objective measures:   Prone hip ext hurts, L/S ext hurts if in prone with hands on table     AROM  Flexion tighter L leg WFL  Extension WFL today  RSB WFL  LSB WFL     None below:  Hip PROm Wfl ea side no issues; L slightly tighter per pt with ER stretch  + piriformis R TTP  Prone press up or prone leg lift brings on LBP        Therapeutic Exercises (CPT 37822):     1. UPOC 9/13/23    2. modified piriformis str, x1'    3. linda str, SKTC with leg no pain; with leg extended LBP    4. nerve tensioner, R/L: side x1' on/off    7. back extension prone I holds, [id number]\"H    8. clams, PTB xFATIGUE    9. bridges, RSB 3x20    10. bird dog LE only, 3x30\", not today    11. wall squats/hack squat, 3x15; 15#    12. split squat, 3x6 15#    13. Shuttle, 5B SL 3x15 ea, up next time      Therapeutic Exercise Summary:  2. Modified piriformis stretch x45\"ea    3. Repeated flexion in standing 2x10    5. Nerve tensioner x3'  -glute bridge with PTB x30  -clams xfatigue ea PTB  -bird dog LE only  -ball squats or wall squats light to no weight    Therapeutic Treatments and Modalities: "     1. Manual Therapy (CPT 10719), long axis traction GIII, not today    2. E Stim Unattended (CPT 30067), x15' heat and IFC; bell in place for safety if too hot    Time-based treatments/modalities:  Physical Therapy Timed Treatment Charges  Therapeutic exercise minutes (CPT 64311): 45 minutes  ASSESSMENT: Pt with improving tolerance to functional strength; still with limited posterior chain strength and pain in sig load with L/S extension prone or with a prone hip extension motion. Cont per POC.     PLAN/RECOMMENDATIONS:   Plan for treatment: therapy treatment to continue next visit.  Planned interventions for next visit: continue with current treatment.

## 2023-08-09 ENCOUNTER — PHYSICAL THERAPY (OUTPATIENT)
Dept: PHYSICAL THERAPY | Facility: REHABILITATION | Age: 63
End: 2023-08-09
Attending: FAMILY MEDICINE
Payer: COMMERCIAL

## 2023-08-09 ENCOUNTER — HOSPITAL ENCOUNTER (OUTPATIENT)
Dept: RADIOLOGY | Facility: MEDICAL CENTER | Age: 63
End: 2023-08-09
Attending: PHYSICAL MEDICINE & REHABILITATION
Payer: COMMERCIAL

## 2023-08-09 DIAGNOSIS — M54.50 LOW BACK PAIN, UNSPECIFIED BACK PAIN LATERALITY, UNSPECIFIED CHRONICITY, UNSPECIFIED WHETHER SCIATICA PRESENT: ICD-10-CM

## 2023-08-09 DIAGNOSIS — M51.26 LUMBAR DISC HERNIATION: ICD-10-CM

## 2023-08-09 DIAGNOSIS — M54.16 LUMBAR RADICULOPATHY: ICD-10-CM

## 2023-08-09 PROCEDURE — 97010 HOT OR COLD PACKS THERAPY: CPT

## 2023-08-09 PROCEDURE — 72148 MRI LUMBAR SPINE W/O DYE: CPT

## 2023-08-09 PROCEDURE — 97014 ELECTRIC STIMULATION THERAPY: CPT

## 2023-08-09 PROCEDURE — 97110 THERAPEUTIC EXERCISES: CPT

## 2023-08-14 ENCOUNTER — PHYSICAL THERAPY (OUTPATIENT)
Dept: PHYSICAL THERAPY | Facility: REHABILITATION | Age: 63
End: 2023-08-14
Attending: FAMILY MEDICINE
Payer: COMMERCIAL

## 2023-08-14 DIAGNOSIS — M51.26 LUMBAR DISC HERNIATION: ICD-10-CM

## 2023-08-14 PROCEDURE — 97010 HOT OR COLD PACKS THERAPY: CPT

## 2023-08-14 PROCEDURE — 97140 MANUAL THERAPY 1/> REGIONS: CPT

## 2023-08-14 PROCEDURE — 97110 THERAPEUTIC EXERCISES: CPT

## 2023-08-14 PROCEDURE — 97012 MECHANICAL TRACTION THERAPY: CPT | Mod: 59

## 2023-08-15 NOTE — OP THERAPY DAILY TREATMENT
"  Outpatient Physical Therapy  DAILY TREATMENT     [facility] Outpatient Physical Therapy [place] [address] [facility], [address]  [place] [address]  Phone:  [phone]    Date: 08/16/2023  Patient: Isadora Williamson  YOB: 1960  MRN: [id number]   Time Calculation  Start time: 0115  Stop time: 0155 Time Calculation (min): 40 minutes   Chief Complaint: No chief complaint on file.  Visit #: 9    SUBJECTIVE: Pt     Sxs: R sided LBP and B buttock pain; and tingling in the lateral legs down to the shins and to the lateral or anterior feet      Aggs:   -hiking or walking notes she can't walk fast mostly on the R,   -couldn't sleep on the R (better now),   -couldn't lie on her stomach or still struggles in prone press up    OBJECTIVE:   Current objective measures:   Prone hip ext hurts, L/S ext hurts if in prone with hands on table     AROM  Flexion tighter L leg WFL  Extension WFL today  RSB WFL  LSB WFL     None below:  Hip PROm Wfl ea side no issues; L slightly tighter per pt with ER stretch  + piriformis R TTP  Prone press up or prone leg lift brings on LBP        Therapeutic Exercises (CPT 14341):     1. UPOC 9/13/23    2. modified piriformis str, x1'    3. linda str, SKTC with leg no pain; with leg extended LBP    4. nerve tensioner, R/L: side x1' on/off    7. back extension prone I holds on RSB, 1p13p70\"H    8. clams, PTB xFATIGUE, not today    9. bridges, x1'    10. bird dog LE only, 3x30\", not today    11. wall squats/hack squat, 3x15; 20#    12. split squat, 3x8 15#    13. Shuttle, 5B SL 3x15 ea, up resistance next time      Therapeutic Exercise Summary:  2. Modified piriformis stretch x45\"ea    3. Repeated flexion in standing 2x10    5. Nerve tensioner x3'  -glute bridge with PTB x30  -clams xfatigue ea PTB  -bird dog LE only  -ball squats or wall squats light to no weight    Therapeutic Treatments and Modalities:     1. Manual Therapy (CPT 75648), long axis traction GIII, not today    2. E Stim " Unattended (CPT 04470), x15' heat and IFC; bell in place for safety if too hot    Time-based treatments/modalities:  Physical Therapy Timed Treatment Charges  Therapeutic exercise minutes (CPT 50539): 40 minutes  ASSESSMENT: pt still with radiating sxs with extension based positions; she was able to progress her weight with LE based movements without increased pain. Cont to progress as able with skilled PT care here. Recommend graded into extension/caution with sig force into extension.    PLAN/RECOMMENDATIONS:   Plan for treatment: therapy treatment to continue next visit.  Planned interventions for next visit: continue with current treatment.

## 2023-08-16 ENCOUNTER — PHYSICAL THERAPY (OUTPATIENT)
Dept: PHYSICAL THERAPY | Facility: REHABILITATION | Age: 63
End: 2023-08-16
Attending: FAMILY MEDICINE
Payer: COMMERCIAL

## 2023-08-16 DIAGNOSIS — M51.26 LUMBAR DISC HERNIATION: ICD-10-CM

## 2023-08-16 PROCEDURE — 97110 THERAPEUTIC EXERCISES: CPT

## 2023-08-21 ENCOUNTER — APPOINTMENT (OUTPATIENT)
Dept: PHYSICAL THERAPY | Facility: REHABILITATION | Age: 63
End: 2023-08-21
Attending: FAMILY MEDICINE
Payer: COMMERCIAL

## 2023-08-23 ENCOUNTER — APPOINTMENT (OUTPATIENT)
Dept: PHYSICAL THERAPY | Facility: REHABILITATION | Age: 63
End: 2023-08-23
Attending: FAMILY MEDICINE
Payer: COMMERCIAL

## 2023-09-05 ENCOUNTER — PHYSICAL THERAPY (OUTPATIENT)
Dept: PHYSICAL THERAPY | Facility: REHABILITATION | Age: 63
End: 2023-09-05
Attending: FAMILY MEDICINE
Payer: COMMERCIAL

## 2023-09-05 DIAGNOSIS — M51.26 LUMBAR DISC HERNIATION: ICD-10-CM

## 2023-09-05 PROCEDURE — 97110 THERAPEUTIC EXERCISES: CPT

## 2023-09-05 NOTE — OP THERAPY DAILY TREATMENT
"  Outpatient Physical Therapy  DAILY TREATMENT     [facility] Outpatient Physical Therapy [place] [address], [address]  [place] [address]  Phone:  [phone]    Date: 09/05/2023  Patient: Isadora Williamson  YOB: 1960  MRN: [id number]   Time Calculation           Chief Complaint: No chief complaint on file.  Visit #: 10    SUBJECTIVE: Pt     Sxs: R sided LBP and B buttock pain; and tingling in the lateral legs down to the shins and to the lateral or anterior feet      Aggs:   -hiking or walking notes she can't walk fast mostly on the R,   -couldn't sleep on the R (better now),   -couldn't lie on her stomach or still struggles in prone press up    OBJECTIVE:   Current objective measures:   Prone hip ext hurts, L/S ext hurts if in prone with hands on table     AROM  Flexion tighter L leg WFL  Extension WFL today  RSB WFL  LSB WFL     None below:  Hip PROm Wfl ea side no issues; L slightly tighter per pt with ER stretch  + piriformis R TTP  Prone press up or prone leg lift brings on LBP        Therapeutic Exercises (CPT 73566):     1. UPOC 9/13/23    2. modified piriformis str, x1'    3. linda str, SKTC with leg no pain; with leg extended LBP    4. nerve tensioner, R/L: side x1' on/off    7. back extension prone I holds on RSB, [id number]\"H    8. clams, PTB xFATIGUE, not today    9. bridges, x1', not today    10. bird dog LE only, 3x30\", not today    11. wall squats/hack squat, 3x15; 20#    12. split squat, 2x10 30# total (2 15# DBs)    13. Shuttle, 5B SL 3x15 ea, up resistance next time      Therapeutic Exercise Summary:  2. Modified piriformis stretch x45\"ea    3. Repeated flexion in standing 2x10    5. Nerve tensioner x3'  -glute bridge with PTB x30  -clams xfatigue ea PTB  -bird dog LE only  -ball squats or wall squats light to no weight    Therapeutic Treatments and Modalities:     1. Manual Therapy (CPT 55093), long axis traction GIII, not today    2. E Stim Unattended (CPT 51348), x15' heat " and IFC; bell in place for safety if too hot    Time-based treatments/modalities:     ASSESSMENT: pt still with radiating sxs with extension based positions; she was able to progress her weight with LE based movements without increased pain. Cont to progress as able with skilled PT care here. Recommend graded into extension/caution with sig force into extension.    PLAN/RECOMMENDATIONS:   Plan for treatment: therapy treatment to continue next visit.  Planned interventions for next visit: continue with current treatment.

## 2023-09-07 ENCOUNTER — PHYSICAL THERAPY (OUTPATIENT)
Dept: PHYSICAL THERAPY | Facility: REHABILITATION | Age: 63
End: 2023-09-07
Attending: FAMILY MEDICINE
Payer: COMMERCIAL

## 2023-09-07 DIAGNOSIS — M51.26 LUMBAR DISC HERNIATION: ICD-10-CM

## 2023-09-07 PROCEDURE — 97110 THERAPEUTIC EXERCISES: CPT

## 2023-09-07 NOTE — OP THERAPY DAILY TREATMENT
"  Outpatient Physical Therapy  DAILY TREATMENT     [facility] Outpatient Physical Therapy [place] [address], [address]  [place] [address]  Phone:  [phone]    Date: 09/07/2023  Patient: Isadora Williamson  YOB: 1960  MRN: [id number]   Time Calculation           Chief Complaint: No chief complaint on file.  Visit #: 11    SUBJECTIVE: Pt     Sxs: R sided LBP and B buttock pain; and tingling in the lateral legs down to the shins and to the lateral or anterior feet      Aggs:   -hiking or walking notes she can't walk fast mostly on the R,   -couldn't sleep on the R (better now),   -couldn't lie on her stomach or still struggles in prone press up    OBJECTIVE:   Current objective measures:   Prone hip ext hurts, L/S ext hurts if in prone with hands on table     AROM  Flexion tighter L leg WFL  Extension WFL today  RSB WFL  LSB WFL     None below:  Hip PROm Wfl ea side no issues; L slightly tighter per pt with ER stretch  + piriformis R TTP  Prone press up or prone leg lift brings on LBP        Therapeutic Exercises (CPT 96801):     1. UPOC 9/13/23    2. modified piriformis str, x1', not today    3. linda str, SKTC with leg no pain; with leg extended LBP, not today    4. nerve tensioner, R/L: side x1' on/off, not today    7. back extension prone I holds on RSB, [id number]\"H, 2lbs today    8. clams, PTB xFATIGUE, not today    9. bridges, x1', not today    10. bird dog LE only, 3x30\", not today    11. wall squats/hack squat, no support today 20# 2x10    12. split squat, 2x10 20#    13. Shuttle, 5B SL 3x15 ea, not today; next time inc resistance    18. ER, scaption, thorac upper in chair, peanut STW      Therapeutic Exercise Summary:  2. Modified piriformis stretch x45\"ea    3. Repeated flexion in standing 2x10    5. Nerve tensioner x3'  -glute bridge with PTB x30  -clams xfatigue ea PTB  -bird dog LE only  -ball squats or wall squats light to no weight    Therapeutic Treatments and Modalities:     1. " Manual Therapy (CPT 72262), long axis traction GIII, not today    2. E Stim Unattended (CPT 89918), x15' heat and IFC; bell in place for safety if too hot    Time-based treatments/modalities:     ASSESSMENT: pt w/ cont patterns of pain; improving strength. She will be on leave for work and will not come back until October. Goal to improve hEP next visit.     PLAN/RECOMMENDATIONS:   Plan for treatment: therapy treatment to continue next visit.  Planned interventions for next visit: continue with current treatment.

## 2023-09-11 NOTE — OP THERAPY DAILY TREATMENT
"  Outpatient Physical Therapy  DAILY TREATMENT     [facility] Physical Therapy [place] [id number] [facility], [address]  [place] [address]  Phone:  [phone]    Date: 09/12/2023  Patient: Isadora Williamson  YOB: 1960  MRN: [id number]   Time Calculation           Chief Complaint: No chief complaint on file.  Visit #: 12    SUBJECTIVE: Notes similar pains but better; is biking now and sleeping better. Hiking still hurts but does seem to calm after she hikes now.     Sxs: R sided LBP and B buttock pain; and tingling in the lateral legs down to the shins and to the lateral or anterior feet      Aggs:   -hiking or walking notes she can't walk fast mostly on the R,   -couldn't sleep on the R (better now),   -couldn't lie on her stomach or still struggles in prone press up    OBJECTIVE:   Prone hip ext painful still; with extension AROM still painful if in prone press up    Strength; fatigues now with 20# at x15 reps split squat  Sl leg press fatigues 6B at 20         Therapeutic Exercises (CPT 21813):     1. UPOC 9/13/23    2. modified piriformis str, x1', not today    3. linda str, SKTC with leg no pain; with leg extended LBP, not today    4. nerve tensioner, R/L: side x1' on/off, not today    7. back extension prone I holds on RSB, [id number]\"H, 2lbs today    8. clams, PTB xFATIGUE, not today    9. bridges, x1', not today    10. bird dog LE only, 3x30\", not today    11. wall squats/hack squat, no support today 20# 2x10, not today    12. split squat, 2x15 20#    13. Shuttle, 6B SL 2x20 ea, not today; next time inc resistance    18. ER, scaption, thorac upper in chair, peanut STW      Therapeutic Exercise Summary:  2. Modified piriformis stretch x45\"ea    3. Repeated flexion in standing 2x10    5. Nerve tensioner x3'  -glute bridge with PTB x30  -clams xfatigue ea PTB  -bird dog LE only  -ball squats or wall squats light to no weight    Therapeutic Treatments and Modalities:     1. Manual Therapy " (CPT 72290), long axis traction GIII, not today    2. E Stim Unattended (CPT 11259), x15' heat and IFC; bell in place for safety if too hot    Time-based treatments/modalities:     ASSESSMENT: Pt has been seen for two months. She has similar patterns of pain and radiating sxs with prolonged extension, AM stiffness, and overall difficulties with functional activity such as hiking. However, her pains have calmed more during activity only and she is having less overall pain. She will be gone for work/vacation in [place] for approx 1.5 months. Plan to resume PT upon return. I recommended an HEP for her and will resume once she has returned with goal of further reducing her sxs and improving her strength for all of her functional activity.     PLAN/RECOMMENDATIONS:   Plan for treatment: therapy treatment to continue next visit.  Planned interventions for next visit: continue with current treatment.

## 2023-09-12 ENCOUNTER — PHYSICAL THERAPY (OUTPATIENT)
Dept: PHYSICAL THERAPY | Facility: REHABILITATION | Age: 63
End: 2023-09-12
Attending: FAMILY MEDICINE
Payer: COMMERCIAL

## 2023-09-12 DIAGNOSIS — M51.26 LUMBAR DISC HERNIATION: ICD-10-CM

## 2023-09-12 PROCEDURE — 97110 THERAPEUTIC EXERCISES: CPT

## 2023-09-12 NOTE — OP THERAPY PROGRESS SUMMARY
Outpatient Physical Therapy  PROGRESS SUMMARY NOTE      [facility] [place]  [address], [address]  [place] [address]  Phone:  [phone]    Date of Visit: 09/12/2023    Patient: Isadora Williamson  YOB: 1960  MRN: [id number]     Referring Provider: Chhaya Chinchilla M.D.  [address]  [place] [address]   Referring Diagnosis Other intervertebral disc displacement, lumbar region [M51.26]         Rehab Potential: fair    Progress Report Period: 7/13-9/12/23    Functional Assessment Used          Objective Findings and Assessment:   Patient progression towards goals:  Pt has been seen for two months. She has similar patterns of pain and radiating sxs with prolonged extension, AM stiffness, and overall difficulties with functional activity such as hiking. However, her pains have calmed more during activity only and she is having less overall pain. She will be gone for work/vacation in [place] for approx 1.5 months. Plan to resume PT upon return. I recommended an HEP for her and will resume once she has returned with goal of further reducing her sxs and improving her strength for all of her functional activity.     Objective findings and assessment details: See daily note 9/12/23    Goals:   Short Term Goals:   -pt meets MCID for RMQ  -pt with ability to perform AROM without pain in L/S (50% MET; ext still painful)  -pt with neg SLR and piriformis palpation to improve n related pain (MET)  -pt with pain in AM <30 min now (MET)    Short term goal time span:  2-4 weeks      Long Term Goals:    NONE MET  -pt meets MCID for RMQ  -pt with ability to perform AROM without pain in L/S and performs IADLs without pain  -pt walks >30 min without pain or issues  -pt with pain in AM <20 min now  -pt lifts >25% BW without pain or issues    Long term goal time span:  6-8 weeks    Plan:   Planned therapy interventions:  E Stim Unattended (CPT 66103), E Stim Attended (CPT  94864), Hot or Cold Pack Therapy (CPT 77053), Manual Therapy (CPT 33195), Mechanical Traction (CPT 37313), Neuromuscular Re-education (CPT 66179), Therapeutic Activities (CPT 90066) and Therapeutic Exercise (CPT 47524)  Frequency:  1x week  Duration in weeks:  12  Duration in visits:  12      Referring provider co-signature:  I have reviewed this plan of care and my co-signature certifies the need for services.     Certification Period: 09/12/2023 to 11/14/23    Physician Signature: ________________________________ Date: ______________

## 2023-10-26 ENCOUNTER — HOSPITAL ENCOUNTER (OUTPATIENT)
Dept: LAB | Facility: MEDICAL CENTER | Age: 63
End: 2023-10-26
Attending: FAMILY MEDICINE
Payer: COMMERCIAL

## 2023-10-26 ENCOUNTER — OFFICE VISIT (OUTPATIENT)
Dept: MEDICAL GROUP | Facility: PHYSICIAN GROUP | Age: 63
End: 2023-10-26
Payer: COMMERCIAL

## 2023-10-26 VITALS
OXYGEN SATURATION: 98 % | BODY MASS INDEX: 25.58 KG/M2 | HEART RATE: 82 BPM | WEIGHT: 139 LBS | SYSTOLIC BLOOD PRESSURE: 118 MMHG | TEMPERATURE: 98.5 F | DIASTOLIC BLOOD PRESSURE: 74 MMHG | HEIGHT: 62 IN

## 2023-10-26 DIAGNOSIS — Z01.84 IMMUNITY STATUS TESTING: ICD-10-CM

## 2023-10-26 DIAGNOSIS — Z00.00 WELLNESS EXAMINATION: Primary | ICD-10-CM

## 2023-10-26 DIAGNOSIS — Z11.3 SCREENING EXAMINATION FOR SEXUALLY TRANSMITTED DISEASE: ICD-10-CM

## 2023-10-26 DIAGNOSIS — Z11.59 NEED FOR HEPATITIS C SCREENING TEST: ICD-10-CM

## 2023-10-26 DIAGNOSIS — A04.8 H. PYLORI INFECTION: ICD-10-CM

## 2023-10-26 LAB
HBV SURFACE AB SERPL IA-ACNC: 1632 MIU/ML (ref 0–10)
HCV AB SER QL: NORMAL
HIV 1+2 AB+HIV1 P24 AG SERPL QL IA: NORMAL

## 2023-10-26 PROCEDURE — 3078F DIAST BP <80 MM HG: CPT | Performed by: FAMILY MEDICINE

## 2023-10-26 PROCEDURE — 86706 HEP B SURFACE ANTIBODY: CPT

## 2023-10-26 PROCEDURE — 87389 HIV-1 AG W/HIV-1&-2 AB AG IA: CPT

## 2023-10-26 PROCEDURE — 86803 HEPATITIS C AB TEST: CPT

## 2023-10-26 PROCEDURE — 36415 COLL VENOUS BLD VENIPUNCTURE: CPT

## 2023-10-26 PROCEDURE — 99396 PREV VISIT EST AGE 40-64: CPT | Performed by: FAMILY MEDICINE

## 2023-10-26 PROCEDURE — 3074F SYST BP LT 130 MM HG: CPT | Performed by: FAMILY MEDICINE

## 2023-10-26 NOTE — PROGRESS NOTES
Subjective:     CC:   Chief Complaint   Patient presents with    Annual Exam       HPI:   Isadora Williamson is a 63 y.o. female who presents for annual exam. She is feeling well and denies any complaints.    Ob-Gyn/ History:    Patient has GYN provider: yes - in [place]  /Para:  3/2  Last Pap Smear:  . No history of abnormal pap smears.  Gyn Surgery:   x2.  Current Contraceptive Method:  N/a. Yes currently sexually active.  Last menstrual period:  .   No significant bloating/fluid retention, pelvic pain, or dyspareunia. No vaginal discharge  Post-menopausal bleeding: no  Urinary incontinence: no  Folate intake: n/a     Health Maintenance  Advanced directive: n/a   Osteoporosis Screen/ DEXA: n/a   PT for falls prevention: n/a   Cholesterol Screening: 10/2023 -  T chol 202, TG 59, HDl 85, ; ASCVD 3.1%  Diabetes Screening: 10/2023 - fasting glucose 90   Aspirin Use: n/a - ASDVD <10%      Anticipatory Guidance  Diet: trying to eat healthy   Exercise: regular   Substance Abuse: no   Safe in relationship.   Seat belts, bike helmet, gun safety discussed.  Sun protection used.  Dental Home.    Cancer screening  Colorectal Cancer Screening: due   Lung Cancer Screening: n/a - never smoker    Cervical Cancer Screening: due    Breast Cancer Screening: due     Infectious disease screening/Immunizations  --STI Screening: declined   --Practices safe sex.  --HIV Screening: ordered   --Hepatitis C Screening: ordered   --Immunizations:    Influenza: deferred    HPV:  n/a    Tetanus: due     Shingles: completed   Pneumococcal : n/a     Hepatitis B: checking the titer  COVID-19:  booster recommended  Other immunizations: RSV recommended    She  has no past medical history on file.  She  has a past surgical history that includes repair, knee, acl (Right, ); primary c section (); and primary c section ().    Family History   Problem Relation Age of Onset     Heart Disease Mother         MI x2    Diabetes Mother     Cancer Sister         Lymphoma    Diabetes Brother     Stroke Maternal Aunt     Ovarian Cancer Neg Hx     Tubal Cancer Neg Hx     Peritoneal Cancer Neg Hx     Colorectal Cancer Neg Hx     Breast Cancer Neg Hx        Social History     Socioeconomic History    Marital status:      Spouse name: Not on file    Number of children: Not on file    Years of education: Not on file    Highest education level: Not on file   Occupational History    Not on file   Tobacco Use    Smoking status: Never    Smokeless tobacco: Never   Vaping Use    Vaping Use: Never used   Substance and Sexual Activity    Alcohol use: Yes     Alcohol/week: 1.8 oz     Types: 3 Glasses of wine per week    Drug use: Never    Sexual activity: Yes     Partners: Male     Birth control/protection: Post-Menopausal     Comment:    Other Topics Concern    Not on file   Social History Narrative    Not on file     Social Determinants of Health     Financial Resource Strain: Not on file   Food Insecurity: Not on file   Transportation Needs: Not on file   Physical Activity: Not on file   Stress: Not on file   Social Connections: Not on file   Intimate Partner Violence: Not on file   Housing Stability: Not on file       Patient Active Problem List    Diagnosis Date Noted    H. pylori infection 04/27/2023    Palpitations 12/15/2022    Elevated blood pressure reading 12/15/2022    Knee pain 12/15/2022    Lumbar disc herniation 12/15/2022    Personal history of colonic polyps 02/04/2021    Hyperlipidemia 03/18/2013         Current Outpatient Medications   Medication Sig Dispense Refill    MULTIPLE VITAMINS-MINERALS PO Take  by mouth.       No current facility-administered medications for this visit.     Allergies   Allergen Reactions    Aspirin Unspecified       Review of Systems  Constitutional: Negative for fever, chills.   HENT: Negative for congestion.    Eyes: Negative for pain.   "  Respiratory: Negative for cough.  Cardiovascular: Negative for leg swelling.   Gastrointestinal: Negative for vomiting.   Genitourinary: Negative for dysuria.   Skin: Negative for rash.   Neurological: Negative for dizziness   Endo/Heme/Allergies: Does not bleed easily.   Psychiatric/Behavioral: Negative for depression.  The patient is not nervous/anxious.      Objective:     /74 (BP Location: Right arm, Patient Position: Sitting, BP Cuff Size: Adult)   Pulse 82   Temp 36.9 °C (98.5 °F) (Temporal)   Ht 1.575 m (5' 2\")   Wt 63 kg (139 lb)   SpO2 98%   BMI 25.42 kg/m²   Body mass index is 25.42 kg/m².  Wt Readings from Last 4 Encounters:   10/26/23 63 kg (139 lb)   07/19/23 62 kg (136 lb 9.6 oz)   04/27/23 60.8 kg (134 lb)   01/13/23 62 kg (136 lb 11 oz)       Physical Exam:  Constitutional: Well-developed and well-nourished. Not diaphoretic. No distress.   Skin: Skin is warm and dry. No rash noted.  Head: Atraumatic without lesions.  Eyes: Conjunctivae and extraocular motions are normal. Pupils are equal, round, and reactive to light. No scleral icterus.   Ears:  External ears unremarkable. Tympanic membranes clear and intact.  Mouth/Throat: Dentition is good. Tongue normal. Oropharynx is clear and moist. Posterior pharynx without erythema or exudates.  Neck: Supple, trachea midline. Normal range of motion. No thyromegaly present. No lymphadenopathy--cervical or supraclavicular.  Cardiovascular: Regular rate and rhythm, S1 and S2 without murmur, rubs, or gallops.  Lungs: Normal inspiratory effort, CTA bilaterally, no wheezes/rhonchi/rales  Abdomen: Soft, non tender, and without distention. Active bowel sounds in all four quadrants. No rebound, guarding, masses or HSM.  Extremities: No cyanosis, clubbing, erythema, nor edema. Distal pulses intact and symmetric.   Musculoskeletal: All major joints AROM full in all directions without pain.  Neurological: Alert and oriented x 3. DTRs 2+/3 and symmetric. No " cranial nerve deficit. 5/5 myotomes. Sensation intact.   Psychiatric:  Behavior, mood, and affect are appropriate.        Assessment and Plan:     1. Wellness examination        2. H. pylori infection  Referral to Gastroenterology      3. Need for hepatitis C screening test  HEP C VIRUS ANTIBODY      4. Immunity status testing  HEP B SURFACE AB      5. Screening examination for sexually transmitted disease  HIV AG/AB COMBO ASSAY SCREENING        HCM:  up to date   Labs per orders  Immunizations per orders  Patient counseled about skin care, diet, supplements, prenatal vitamins, safe sex and exercise.    Follow-up: Return in about 1 year (around 10/26/2024) for Annual/wellness visit.

## 2023-10-27 ENCOUNTER — PHYSICAL THERAPY (OUTPATIENT)
Dept: PHYSICAL THERAPY | Facility: REHABILITATION | Age: 63
End: 2023-10-27
Attending: FAMILY MEDICINE
Payer: COMMERCIAL

## 2023-10-27 DIAGNOSIS — M51.26 LUMBAR DISC HERNIATION: ICD-10-CM

## 2023-10-27 PROCEDURE — 97110 THERAPEUTIC EXERCISES: CPT

## 2023-10-27 PROCEDURE — 97014 ELECTRIC STIMULATION THERAPY: CPT

## 2023-10-27 PROCEDURE — 97140 MANUAL THERAPY 1/> REGIONS: CPT

## 2023-10-27 NOTE — OP THERAPY DAILY TREATMENT
"  Outpatient Physical Therapy  DAILY TREATMENT     [facility] Outpatient Physical Therapy [place] [address] [facility], [address]  [place] [address]  Phone:  [phone]    Date: 10/27/2023    Patient: Isadora Williamson  YOB: 1960  MRN: [id number]     Time Calculation    Start time: 0930  Stop time: 1030 Time Calculation (min): 60 minutes         Chief Complaint: back pain  Visit #: 13    SUBJECTIVE:  Pt states she's feeling better with less LE's pain.     OBJECTIVE:          Therapeutic Exercises (CPT 20398):     1. UPOC 9/13/23    2. modified piriformis str, x1'    3. linda str, SKTC with leg no pain; with leg extended LBP    4. hs curls    5. ball bridging, holds and slow reps    6. prayer stretch    8. clams, PTB xFATIGUE    9. bridges, x1'    10. bird dog, 3x30\"    11. wall squats/hack squat, no support today 20# 2x10    12. split squat, 2x15 20#    13. Shuttle, 6B SL 2x20 ea    18. ER, scaption, thorac upper in chair, peanut STW      Therapeutic Exercise Summary:  2. Modified piriformis stretch x45\"ea    3. Repeated flexion in standing 2x10    5. Nerve tensioner x3'  -glute bridge with PTB x30  -clams xfatigue ea PTB  -bird dog LE only  -ball squats or wall squats light to no weight    Therapeutic Treatments and Modalities:     1. Manual Therapy (CPT 76460), long axis traction GIII, not today    2. E Stim Unattended (CPT 49883), x15' heat and IFC; bell in place for safety if too hot    Time-based treatments/modalities:    Physical Therapy Timed Treatment Charges  Manual therapy minutes (CPT 31876): 15 minutes  Therapeutic exercise minutes (CPT 76728): 30 minutes        ASSESSMENT:   Response to treatment: pt is doing well overall and is able to centralize her pain with flexion activities.     PLAN/RECOMMENDATIONS:   Plan for treatment: therapy treatment to continue next visit.  Planned interventions for next visit: continue with current treatment, E-stim unattended (CPT 24931), manual therapy (CPT " 86010), neuromuscular re-education (CPT 21332), and therapeutic exercise (CPT 43109).

## 2023-11-01 ENCOUNTER — PHYSICAL THERAPY (OUTPATIENT)
Dept: PHYSICAL THERAPY | Facility: REHABILITATION | Age: 63
End: 2023-11-01
Attending: FAMILY MEDICINE
Payer: COMMERCIAL

## 2023-11-01 DIAGNOSIS — M51.26 LUMBAR DISC HERNIATION: ICD-10-CM

## 2023-11-01 PROCEDURE — 97110 THERAPEUTIC EXERCISES: CPT

## 2023-11-01 NOTE — OP THERAPY DAILY TREATMENT
"  Outpatient Physical Therapy  DAILY TREATMENT     [facility] Outpatient Physical Therapy [place]  [address], [address]  [place] [address]  Phone:  [phone]    Date: 11/01/2023    Patient: Isadora Williamson  YOB: 1960  MRN: [id number]     Time Calculation    Start time: 1100  Stop time: 1145 Time Calculation (min): 45 minutes         Chief Complaint: Lumbar problem  Visit #: 14    SUBJECTIVE:  Feeling good but not getting in enough exercise at home    OBJECTIVE:  Current objective measures: +modified linda test right          Therapeutic Exercises (CPT 42810):     1. UPOC 9/13/23    2. modified piriformis str, x1'    5. ball bridging, holds and slow reps, NT    6. prayer stretch, x10    8. clams, PTB xFATIGUE    9. bridges, x1'    10. bird dog, 3x30\", increase//NW right post leg pain    11. wall ball squat, 20# 3x10    12. split squat, 2x15 20#    13. [place] carry 15lb, 2 x 1 min B    14. ROW with resistance, purple band 2 x 15    15. shoulder ext with resistance, purple band 32 x 15    16. repeated hip extension in half kneel, x 10    18. ER, scaption, thorac upper in chair, peanut STW, NT      Therapeutic Exercise Summary:  2. Modified piriformis stretch x45\"ea    3. Repeated flexion in standing 2x10    5. Nerve tensioner x3'  -glute bridge with PTB x30  -clams xfatigue ea PTB  -bird dog LE only  -ball squats or wall squats light to no weight    Therapeutic Treatments and Modalities:     1. Manual Therapy (CPT 57712), lumbar extension mobilization    2. E Stim Unattended (CPT 01440), x15' heat and IFC; bell in place for safety if too hot    Time-based treatments/modalities:    Physical Therapy Timed Treatment Charges  Manual therapy minutes (CPT 73911): 5 minutes  Therapeutic exercise minutes (CPT 92624): 35 minutes    ASSESSMENT:   Response to treatment: tolerating increased functional core/strength work. Added rows and shoulder extension with resistance for shoulder/back symptoms and " repeated hip extension in half kneel.  HO give.  Continue to progress per current POC    PLAN/RECOMMENDATIONS:   Plan for treatment: therapy treatment to continue next visit.  Planned interventions for next visit: continue with current treatment.

## 2023-11-02 NOTE — OP THERAPY DAILY TREATMENT
"  Outpatient Physical Therapy  DAILY TREATMENT     [facility] [place]  [address], [address]  [place] [address]  Phone:  [phone]    Date: 11/03/2023  Patient: Isadora Williamson  YOB: 1960  MRN: [id number]   Time Calculation  Start time: 0930  Stop time: 1008 Time Calculation (min): 38 minutes   Chief Complaint: Lumbar problem  Visit #: 15    SUBJECTIVE: Reports her sxs are much more controlled. About to go to [place] and with busy season requests a break from PT.     OBJECTIVE:   Current objective measures: +modified linda test right          Therapeutic Exercises (CPT 04475):     1. UPOC 9/13/23    2. modified piriformis str, x1'    5. ball bridging, holds and slow reps, NT    6. prayer stretch, x10    8. clams, PTB xFATIGUE    9. bridges, x1'    10. bird dog, 3x30\", increase//NW right post leg pain    11. wall ball squat, 20# 3x10    12. split squat, 2x15 20#    13. [place] carry 15lb, 2 x 1 min B    14. ROW with resistance, purple band 2 x 15    15. shoulder ext with resistance, purple band 32 x 15    16. repeated hip extension in half kneel, x 10    18. ER, scaption, thorac upper in chair, peanut STW, NT      Therapeutic Exercise Summary:  2. Modified piriformis stretch x45\"ea    3. Repeated flexion in standing 2x10    5. Nerve tensioner x3'  -glute bridge with PTB x30  -clams xfatigue ea PTB  -bird dog LE only  -ball squats or wall squats light to no weight    Therapeutic Treatments and Modalities:     1. Manual Therapy (CPT 08349), prone GIV T/S mobs    2. E Stim Unattended (CPT 87164), x15' heat and IFC; bell in place for safety if too hot, not today    Time-based treatments/modalities:    Physical Therapy Timed Treatment Charges  Manual therapy minutes (CPT 47290): 10 minutes  Therapeutic exercise minutes (CPT 87113): 28 minutes    ASSESSMENT: Pt with cont s/sx of pain with extension based activity. Improving shoulder sxs. Cont to recommend HEP with HO of " scap exercises, posterior chain exercises. Will leave case open and if no word >30 days will DC case. Pt in agreement to plan.    PLAN/RECOMMENDATIONS:   Plan for treatment: therapy treatment to continue next visit.  Planned interventions for next visit: continue with current treatment.

## 2023-11-03 ENCOUNTER — PHYSICAL THERAPY (OUTPATIENT)
Dept: PHYSICAL THERAPY | Facility: REHABILITATION | Age: 63
End: 2023-11-03
Attending: FAMILY MEDICINE
Payer: COMMERCIAL

## 2023-11-03 DIAGNOSIS — M51.26 LUMBAR DISC HERNIATION: ICD-10-CM

## 2023-11-03 PROCEDURE — 97110 THERAPEUTIC EXERCISES: CPT

## 2023-11-03 PROCEDURE — 97140 MANUAL THERAPY 1/> REGIONS: CPT

## 2024-04-23 ENCOUNTER — TELEPHONE (OUTPATIENT)
Dept: PHYSICAL THERAPY | Facility: REHABILITATION | Age: 64
End: 2024-04-23
Payer: COMMERCIAL

## 2024-04-23 NOTE — OP THERAPY DISCHARGE SUMMARY
Outpatient Physical Therapy  DISCHARGE SUMMARY NOTE      [facility] [address], [address]  [place] [address]  Phone:  [phone]    Date of Visit: 04/23/2024    Patient: Isadora Williamson  YOB: 1960  MRN: [id number]     Referring Provider: Chhaya Chinchilla M.D.     Referring Diagnosis No admission diagnoses are documented for this encounter.         Functional Assessment Used        Your patient is being discharged from Physical Therapy with the following comments:   Patient has failed to schedule or reschedule follow-up visits    Comments:  Patient has failed to schedule follow up visits; no contact since last visit and lapse in care >30 days at this time. Due to company policy patient will be discharged and in need of a new referral should skilled PT care be needed. Recommend follow up with PCP for ongoing issues.         Jose Allen, PT    Date: 4/23/2024

## 2024-10-31 ENCOUNTER — APPOINTMENT (OUTPATIENT)
Dept: MEDICAL GROUP | Facility: PHYSICIAN GROUP | Age: 64
End: 2024-10-31
Payer: COMMERCIAL

## 2024-10-31 VITALS
WEIGHT: 139 LBS | SYSTOLIC BLOOD PRESSURE: 132 MMHG | HEIGHT: 63 IN | BODY MASS INDEX: 24.63 KG/M2 | TEMPERATURE: 97.8 F | DIASTOLIC BLOOD PRESSURE: 60 MMHG | OXYGEN SATURATION: 98 % | HEART RATE: 89 BPM

## 2024-10-31 DIAGNOSIS — Z23 NEED FOR VACCINATION: ICD-10-CM

## 2024-10-31 DIAGNOSIS — Z00.00 WELLNESS EXAMINATION: Primary | ICD-10-CM

## 2024-10-31 DIAGNOSIS — Z86.19 HISTORY OF HELICOBACTER PYLORI INFECTION: ICD-10-CM

## 2024-10-31 ASSESSMENT — PATIENT HEALTH QUESTIONNAIRE - PHQ9: CLINICAL INTERPRETATION OF PHQ2 SCORE: 0
